# Patient Record
Sex: FEMALE | Race: WHITE | NOT HISPANIC OR LATINO | URBAN - METROPOLITAN AREA
[De-identification: names, ages, dates, MRNs, and addresses within clinical notes are randomized per-mention and may not be internally consistent; named-entity substitution may affect disease eponyms.]

---

## 2017-02-21 ENCOUNTER — APPOINTMENT (OUTPATIENT)
Dept: URBAN - METROPOLITAN AREA CLINIC 219 | Age: 37
Setting detail: DERMATOLOGY
End: 2017-02-21

## 2017-02-21 DIAGNOSIS — L71.8 OTHER ROSACEA: ICD-10-CM

## 2017-02-21 DIAGNOSIS — L82.1 OTHER SEBORRHEIC KERATOSIS: ICD-10-CM

## 2017-02-21 DIAGNOSIS — D22 MELANOCYTIC NEVI: ICD-10-CM

## 2017-02-21 DIAGNOSIS — L663 OTHER SPECIFIED DISEASES OF HAIR AND HAIR FOLLICLES: ICD-10-CM

## 2017-02-21 DIAGNOSIS — L738 OTHER SPECIFIED DISEASES OF HAIR AND HAIR FOLLICLES: ICD-10-CM

## 2017-02-21 PROBLEM — L55.1 SUNBURN OF SECOND DEGREE: Status: ACTIVE | Noted: 2017-02-21

## 2017-02-21 PROBLEM — L02.222 FURUNCLE OF BACK [ANY PART, EXCEPT BUTTOCK]: Status: ACTIVE | Noted: 2017-02-21

## 2017-02-21 PROBLEM — D22.5 MELANOCYTIC NEVI OF TRUNK: Status: ACTIVE | Noted: 2017-02-21

## 2017-02-21 PROBLEM — D23.39 OTHER BENIGN NEOPLASM OF SKIN OF OTHER PARTS OF FACE: Status: ACTIVE | Noted: 2017-02-21

## 2017-02-21 PROCEDURE — OTHER PRESCRIPTION: OTHER

## 2017-02-21 PROCEDURE — OTHER COUNSELING: OTHER

## 2017-02-21 PROCEDURE — OTHER MIPS QUALITY: OTHER

## 2017-02-21 PROCEDURE — OTHER OBSERVATION AND MEASURE: OTHER

## 2017-02-21 PROCEDURE — 99213 OFFICE O/P EST LOW 20 MIN: CPT

## 2017-02-21 PROCEDURE — OTHER TREATMENT REGIMEN: OTHER

## 2017-02-21 PROCEDURE — OTHER REASSURANCE: OTHER

## 2017-02-21 PROCEDURE — OTHER OBSERVATION: OTHER

## 2017-02-21 RX ORDER — METRONIDAZOLE 7.5 MG/G
APPLY GEL TOPICAL TWICE A DAY
Qty: 1 | Refills: 5 | Status: ERX

## 2017-02-21 RX ORDER — CLINDAMYCIN PHOSPHATE 10 MG/ML
APPLY SOLUTION TOPICAL
Qty: 1 | Refills: 5 | Status: ERX | COMMUNITY
Start: 2017-02-21

## 2017-02-21 ASSESSMENT — LOCATION ZONE DERM
LOCATION ZONE: TRUNK
LOCATION ZONE: FACE

## 2017-02-21 ASSESSMENT — LOCATION DETAILED DESCRIPTION DERM
LOCATION DETAILED: RIGHT SUPERIOR MEDIAL UPPER BACK
LOCATION DETAILED: LEFT MEDIAL UPPER BACK
LOCATION DETAILED: RIGHT SUPERIOR CENTRAL MALAR CHEEK
LOCATION DETAILED: LEFT SUPERIOR MEDIAL UPPER BACK

## 2017-02-21 ASSESSMENT — LOCATION SIMPLE DESCRIPTION DERM
LOCATION SIMPLE: RIGHT CHEEK
LOCATION SIMPLE: LEFT UPPER BACK
LOCATION SIMPLE: RIGHT UPPER BACK

## 2017-02-21 NOTE — PROCEDURE: TREATMENT REGIMEN
Plan: Clindamycin Solution twice a day as needed
Detail Level: Simple
Detail Level: Detailed
Continue Regimen: Metrogel 0.75% twice a day \\nDaily moisturizer with sunscreen

## 2017-08-22 ENCOUNTER — APPOINTMENT (OUTPATIENT)
Dept: URBAN - METROPOLITAN AREA CLINIC 219 | Age: 37
Setting detail: DERMATOLOGY
End: 2017-08-22

## 2017-08-22 DIAGNOSIS — L71.8 OTHER ROSACEA: ICD-10-CM

## 2017-08-22 DIAGNOSIS — L663 OTHER SPECIFIED DISEASES OF HAIR AND HAIR FOLLICLES: ICD-10-CM

## 2017-08-22 DIAGNOSIS — L738 OTHER SPECIFIED DISEASES OF HAIR AND HAIR FOLLICLES: ICD-10-CM

## 2017-08-22 DIAGNOSIS — D22 MELANOCYTIC NEVI: ICD-10-CM

## 2017-08-22 DIAGNOSIS — L82.1 OTHER SEBORRHEIC KERATOSIS: ICD-10-CM

## 2017-08-22 PROBLEM — L02.222 FURUNCLE OF BACK [ANY PART, EXCEPT BUTTOCK]: Status: ACTIVE | Noted: 2017-08-22

## 2017-08-22 PROBLEM — D22.5 MELANOCYTIC NEVI OF TRUNK: Status: ACTIVE | Noted: 2017-08-22

## 2017-08-22 PROBLEM — D23.39 OTHER BENIGN NEOPLASM OF SKIN OF OTHER PARTS OF FACE: Status: ACTIVE | Noted: 2017-08-22

## 2017-08-22 PROCEDURE — OTHER OBSERVATION: OTHER

## 2017-08-22 PROCEDURE — OTHER OBSERVATION AND MEASURE: OTHER

## 2017-08-22 PROCEDURE — OTHER COUNSELING: OTHER

## 2017-08-22 PROCEDURE — OTHER TREATMENT REGIMEN: OTHER

## 2017-08-22 PROCEDURE — 99213 OFFICE O/P EST LOW 20 MIN: CPT

## 2017-08-22 PROCEDURE — OTHER REASSURANCE: OTHER

## 2017-08-22 ASSESSMENT — LOCATION SIMPLE DESCRIPTION DERM
LOCATION SIMPLE: RIGHT BREAST
LOCATION SIMPLE: RIGHT CHEEK
LOCATION SIMPLE: LEFT UPPER BACK
LOCATION SIMPLE: ABDOMEN
LOCATION SIMPLE: RIGHT UPPER BACK

## 2017-08-22 ASSESSMENT — LOCATION DETAILED DESCRIPTION DERM
LOCATION DETAILED: RIGHT MEDIAL BREAST 3-4:00 REGION
LOCATION DETAILED: LEFT MEDIAL UPPER BACK
LOCATION DETAILED: RIGHT CENTRAL MALAR CHEEK
LOCATION DETAILED: RIGHT SUPERIOR MEDIAL UPPER BACK
LOCATION DETAILED: LEFT SUPERIOR MEDIAL UPPER BACK
LOCATION DETAILED: RIGHT RIB CAGE

## 2017-08-22 ASSESSMENT — LOCATION ZONE DERM
LOCATION ZONE: TRUNK
LOCATION ZONE: FACE

## 2017-08-22 NOTE — PROCEDURE: TREATMENT REGIMEN
Plan: Clindamycin Solution twice a day as needed
Detail Level: Detailed
Detail Level: Simple
Continue Regimen: Metrogel 0.75% twice a day \\nDaily moisturizer with sunscreen

## 2017-08-22 NOTE — PROCEDURE: OBSERVATION
Size Of Lesion: 2mm
Body Location Override (Optional - Billing Will Still Be Based On Selected Body Map Location If Applicable): right breast
Detail Level: Detailed
Size Of Lesion: 4 mm